# Patient Record
Sex: MALE | Race: WHITE | NOT HISPANIC OR LATINO | Employment: FULL TIME | URBAN - METROPOLITAN AREA
[De-identification: names, ages, dates, MRNs, and addresses within clinical notes are randomized per-mention and may not be internally consistent; named-entity substitution may affect disease eponyms.]

---

## 2022-12-23 ENCOUNTER — HOSPITAL ENCOUNTER (OUTPATIENT)
Facility: HOSPITAL | Age: 62
Setting detail: OBSERVATION
Discharge: HOME | End: 2022-12-24
Attending: STUDENT IN AN ORGANIZED HEALTH CARE EDUCATION/TRAINING PROGRAM | Admitting: INTERNAL MEDICINE
Payer: COMMERCIAL

## 2022-12-23 DIAGNOSIS — G62.9 NEUROPATHY: ICD-10-CM

## 2022-12-23 DIAGNOSIS — I48.91 ATRIAL FIBRILLATION WITH RAPID VENTRICULAR RESPONSE (H): ICD-10-CM

## 2022-12-23 DIAGNOSIS — K21.9 GASTROESOPHAGEAL REFLUX DISEASE WITHOUT ESOPHAGITIS: Primary | ICD-10-CM

## 2022-12-23 PROBLEM — E66.9 OBESITY: Status: ACTIVE | Noted: 2021-02-16

## 2022-12-23 PROBLEM — I48.0 PAROXYSMAL ATRIAL FIBRILLATION (H): Status: ACTIVE | Noted: 2019-11-14

## 2022-12-23 PROBLEM — R07.89 ATYPICAL CHEST PAIN: Status: ACTIVE | Noted: 2022-12-23

## 2022-12-23 LAB
ALBUMIN SERPL BCG-MCNC: 4.1 G/DL (ref 3.5–5.2)
ALP SERPL-CCNC: 68 U/L (ref 40–129)
ALT SERPL W P-5'-P-CCNC: 76 U/L (ref 10–50)
ANION GAP SERPL CALCULATED.3IONS-SCNC: 8 MMOL/L (ref 7–15)
AST SERPL W P-5'-P-CCNC: 59 U/L (ref 10–50)
BASOPHILS # BLD AUTO: 0.1 10E3/UL (ref 0–0.2)
BASOPHILS NFR BLD AUTO: 1 %
BILIRUB SERPL-MCNC: 0.3 MG/DL
BUN SERPL-MCNC: 21.3 MG/DL (ref 8–23)
CALCIUM SERPL-MCNC: 8.8 MG/DL (ref 8.8–10.2)
CHLORIDE SERPL-SCNC: 110 MMOL/L (ref 98–107)
CREAT SERPL-MCNC: 0.86 MG/DL (ref 0.67–1.17)
DEPRECATED HCO3 PLAS-SCNC: 26 MMOL/L (ref 22–29)
EOSINOPHIL # BLD AUTO: 0.2 10E3/UL (ref 0–0.7)
EOSINOPHIL NFR BLD AUTO: 2 %
ERYTHROCYTE [DISTWIDTH] IN BLOOD BY AUTOMATED COUNT: 13.2 % (ref 10–15)
GFR SERPL CREATININE-BSD FRML MDRD: >90 ML/MIN/1.73M2
GLUCOSE SERPL-MCNC: 131 MG/DL (ref 70–99)
HCT VFR BLD AUTO: 50.2 % (ref 40–53)
HGB BLD-MCNC: 16.7 G/DL (ref 13.3–17.7)
IMM GRANULOCYTES # BLD: 0 10E3/UL
IMM GRANULOCYTES NFR BLD: 0 %
LYMPHOCYTES # BLD AUTO: 1.6 10E3/UL (ref 0.8–5.3)
LYMPHOCYTES NFR BLD AUTO: 18 %
MAGNESIUM SERPL-MCNC: 2.2 MG/DL (ref 1.7–2.3)
MCH RBC QN AUTO: 29.7 PG (ref 26.5–33)
MCHC RBC AUTO-ENTMCNC: 33.3 G/DL (ref 31.5–36.5)
MCV RBC AUTO: 89 FL (ref 78–100)
MONOCYTES # BLD AUTO: 1.2 10E3/UL (ref 0–1.3)
MONOCYTES NFR BLD AUTO: 13 %
NEUTROPHILS # BLD AUTO: 5.7 10E3/UL (ref 1.6–8.3)
NEUTROPHILS NFR BLD AUTO: 66 %
NRBC # BLD AUTO: 0 10E3/UL
NRBC BLD AUTO-RTO: 0 /100
PLATELET # BLD AUTO: 170 10E3/UL (ref 150–450)
POTASSIUM SERPL-SCNC: 4.2 MMOL/L (ref 3.4–5.3)
PROT SERPL-MCNC: 7 G/DL (ref 6.4–8.3)
RBC # BLD AUTO: 5.63 10E6/UL (ref 4.4–5.9)
SODIUM SERPL-SCNC: 144 MMOL/L (ref 136–145)
TROPONIN T SERPL HS-MCNC: 11 NG/L
TROPONIN T SERPL HS-MCNC: 32 NG/L
TSH SERPL DL<=0.005 MIU/L-ACNC: 3.69 UIU/ML (ref 0.3–4.2)
WBC # BLD AUTO: 8.8 10E3/UL (ref 4–11)

## 2022-12-23 PROCEDURE — 36415 COLL VENOUS BLD VENIPUNCTURE: CPT | Performed by: PHYSICIAN ASSISTANT

## 2022-12-23 PROCEDURE — 84443 ASSAY THYROID STIM HORMONE: CPT | Performed by: PHYSICIAN ASSISTANT

## 2022-12-23 PROCEDURE — 258N000003 HC RX IP 258 OP 636: Performed by: PHYSICIAN ASSISTANT

## 2022-12-23 PROCEDURE — 99219 PR INITIAL OBSERVATION CARE,LEVEL II: CPT | Performed by: INTERNAL MEDICINE

## 2022-12-23 PROCEDURE — 93005 ELECTROCARDIOGRAM TRACING: CPT | Mod: 76 | Performed by: STUDENT IN AN ORGANIZED HEALTH CARE EDUCATION/TRAINING PROGRAM

## 2022-12-23 PROCEDURE — 93005 ELECTROCARDIOGRAM TRACING: CPT | Performed by: EMERGENCY MEDICINE

## 2022-12-23 PROCEDURE — G0378 HOSPITAL OBSERVATION PER HR: HCPCS

## 2022-12-23 PROCEDURE — 250N000013 HC RX MED GY IP 250 OP 250 PS 637: Performed by: PHYSICIAN ASSISTANT

## 2022-12-23 PROCEDURE — 85025 COMPLETE CBC W/AUTO DIFF WBC: CPT | Performed by: PHYSICIAN ASSISTANT

## 2022-12-23 PROCEDURE — 99285 EMERGENCY DEPT VISIT HI MDM: CPT | Mod: 25

## 2022-12-23 PROCEDURE — 96365 THER/PROPH/DIAG IV INF INIT: CPT

## 2022-12-23 PROCEDURE — 96361 HYDRATE IV INFUSION ADD-ON: CPT

## 2022-12-23 PROCEDURE — 96366 THER/PROPH/DIAG IV INF ADDON: CPT

## 2022-12-23 PROCEDURE — 83735 ASSAY OF MAGNESIUM: CPT | Performed by: PHYSICIAN ASSISTANT

## 2022-12-23 PROCEDURE — 93005 ELECTROCARDIOGRAM TRACING: CPT | Performed by: PHYSICIAN ASSISTANT

## 2022-12-23 PROCEDURE — 96375 TX/PRO/DX INJ NEW DRUG ADDON: CPT

## 2022-12-23 PROCEDURE — 250N000009 HC RX 250: Performed by: PHYSICIAN ASSISTANT

## 2022-12-23 PROCEDURE — 80053 COMPREHEN METABOLIC PANEL: CPT | Performed by: PHYSICIAN ASSISTANT

## 2022-12-23 PROCEDURE — 96376 TX/PRO/DX INJ SAME DRUG ADON: CPT

## 2022-12-23 PROCEDURE — 250N000011 HC RX IP 250 OP 636: Performed by: PHYSICIAN ASSISTANT

## 2022-12-23 PROCEDURE — 84484 ASSAY OF TROPONIN QUANT: CPT | Performed by: PHYSICIAN ASSISTANT

## 2022-12-23 PROCEDURE — 96374 THER/PROPH/DIAG INJ IV PUSH: CPT

## 2022-12-23 RX ORDER — DILTIAZEM HYDROCHLORIDE 30 MG/1
30 TABLET, FILM COATED ORAL ONCE
Status: COMPLETED | OUTPATIENT
Start: 2022-12-23 | End: 2022-12-23

## 2022-12-23 RX ORDER — ATENOLOL 25 MG/1
25 TABLET ORAL 2 TIMES DAILY
Status: ON HOLD | COMMUNITY
End: 2022-12-24

## 2022-12-23 RX ORDER — DILTIAZEM HYDROCHLORIDE 5 MG/ML
15 INJECTION INTRAVENOUS ONCE
Status: COMPLETED | OUTPATIENT
Start: 2022-12-23 | End: 2022-12-23

## 2022-12-23 RX ORDER — ASPIRIN 81 MG/1
162 TABLET, CHEWABLE ORAL ONCE
Status: COMPLETED | OUTPATIENT
Start: 2022-12-23 | End: 2022-12-23

## 2022-12-23 RX ORDER — GABAPENTIN 300 MG/1
300 CAPSULE ORAL 2 TIMES DAILY
COMMUNITY
End: 2022-12-26

## 2022-12-23 RX ORDER — HEPARIN SODIUM 10000 [USP'U]/100ML
0-5000 INJECTION, SOLUTION INTRAVENOUS CONTINUOUS
Status: DISCONTINUED | OUTPATIENT
Start: 2022-12-23 | End: 2022-12-24

## 2022-12-23 RX ORDER — DILTIAZEM HYDROCHLORIDE 5 MG/ML
25 INJECTION INTRAVENOUS ONCE
Status: COMPLETED | OUTPATIENT
Start: 2022-12-23 | End: 2022-12-23

## 2022-12-23 RX ORDER — ACETAMINOPHEN 500 MG
500-1000 TABLET ORAL EVERY 6 HOURS PRN
COMMUNITY

## 2022-12-23 RX ADMIN — DILTIAZEM HYDROCHLORIDE 15 MG: 5 INJECTION INTRAVENOUS at 20:23

## 2022-12-23 RX ADMIN — HEPARIN SODIUM AND DEXTROSE 1200 UNITS/HR: 10000; 5 INJECTION INTRAVENOUS at 22:32

## 2022-12-23 RX ADMIN — DILTIAZEM HYDROCHLORIDE 30 MG: 30 TABLET, FILM COATED ORAL at 19:33

## 2022-12-23 RX ADMIN — SODIUM CHLORIDE 1000 ML: 9 INJECTION, SOLUTION INTRAVENOUS at 19:04

## 2022-12-23 RX ADMIN — DILTIAZEM HYDROCHLORIDE 25 MG: 5 INJECTION INTRAVENOUS at 19:03

## 2022-12-23 RX ADMIN — DILTIAZEM HYDROCHLORIDE 5 MG/HR: 5 INJECTION, SOLUTION INTRAVENOUS at 21:33

## 2022-12-23 RX ADMIN — ASPIRIN 81 MG CHEWABLE TABLET 162 MG: 81 TABLET CHEWABLE at 20:56

## 2022-12-23 ASSESSMENT — ENCOUNTER SYMPTOMS
LIGHT-HEADEDNESS: 1
GASTROINTESTINAL NEGATIVE: 1
MUSCULOSKELETAL NEGATIVE: 1
PALPITATIONS: 1
RESPIRATORY NEGATIVE: 1
SHORTNESS OF BREATH: 0

## 2022-12-23 ASSESSMENT — ACTIVITIES OF DAILY LIVING (ADL)
ADLS_ACUITY_SCORE: 35

## 2022-12-24 ENCOUNTER — APPOINTMENT (OUTPATIENT)
Dept: CARDIOLOGY | Facility: HOSPITAL | Age: 62
End: 2022-12-24
Attending: INTERNAL MEDICINE
Payer: COMMERCIAL

## 2022-12-24 VITALS
BODY MASS INDEX: 40.43 KG/M2 | OXYGEN SATURATION: 97 % | HEIGHT: 74 IN | HEART RATE: 58 BPM | SYSTOLIC BLOOD PRESSURE: 130 MMHG | DIASTOLIC BLOOD PRESSURE: 73 MMHG | TEMPERATURE: 97.9 F | WEIGHT: 315 LBS | RESPIRATION RATE: 18 BRPM

## 2022-12-24 PROBLEM — I48.91 ATRIAL FIBRILLATION WITH RVR (H): Status: ACTIVE | Noted: 2022-12-24

## 2022-12-24 PROBLEM — K21.9 GASTROESOPHAGEAL REFLUX DISEASE: Status: ACTIVE | Noted: 2022-12-24

## 2022-12-24 PROBLEM — N40.0 BENIGN PROSTATIC HYPERPLASIA: Status: ACTIVE | Noted: 2022-12-24

## 2022-12-24 LAB
HOLD SPECIMEN: NORMAL
INR PPP: 0.99 (ref 0.85–1.15)
LVEF ECHO: NORMAL
MAGNESIUM SERPL-MCNC: 2.2 MG/DL (ref 1.7–2.3)
POTASSIUM SERPL-SCNC: 4.3 MMOL/L (ref 3.4–5.3)
UFH PPP CHRO-ACNC: 0.24 IU/ML
UFH PPP CHRO-ACNC: 0.37 IU/ML
UFH PPP CHRO-ACNC: 0.42 IU/ML
UFH PPP CHRO-ACNC: >1.1 IU/ML

## 2022-12-24 PROCEDURE — 85610 PROTHROMBIN TIME: CPT | Performed by: INTERNAL MEDICINE

## 2022-12-24 PROCEDURE — 250N000013 HC RX MED GY IP 250 OP 250 PS 637: Performed by: INTERNAL MEDICINE

## 2022-12-24 PROCEDURE — 96376 TX/PRO/DX INJ SAME DRUG ADON: CPT

## 2022-12-24 PROCEDURE — 36415 COLL VENOUS BLD VENIPUNCTURE: CPT | Performed by: INTERNAL MEDICINE

## 2022-12-24 PROCEDURE — 258N000003 HC RX IP 258 OP 636: Performed by: PHYSICIAN ASSISTANT

## 2022-12-24 PROCEDURE — 250N000011 HC RX IP 250 OP 636: Performed by: PHYSICIAN ASSISTANT

## 2022-12-24 PROCEDURE — 85520 HEPARIN ASSAY: CPT | Mod: 91 | Performed by: INTERNAL MEDICINE

## 2022-12-24 PROCEDURE — 99204 OFFICE O/P NEW MOD 45 MIN: CPT | Performed by: INTERNAL MEDICINE

## 2022-12-24 PROCEDURE — G0378 HOSPITAL OBSERVATION PER HR: HCPCS

## 2022-12-24 PROCEDURE — 999N000208 ECHOCARDIOGRAM COMPLETE

## 2022-12-24 PROCEDURE — 85520 HEPARIN ASSAY: CPT | Performed by: PHYSICIAN ASSISTANT

## 2022-12-24 PROCEDURE — 99217 PR OBSERVATION CARE DISCHARGE: CPT | Performed by: INTERNAL MEDICINE

## 2022-12-24 PROCEDURE — 83735 ASSAY OF MAGNESIUM: CPT | Performed by: INTERNAL MEDICINE

## 2022-12-24 PROCEDURE — 250N000009 HC RX 250: Performed by: PHYSICIAN ASSISTANT

## 2022-12-24 PROCEDURE — 84132 ASSAY OF SERUM POTASSIUM: CPT | Performed by: INTERNAL MEDICINE

## 2022-12-24 PROCEDURE — 99207 PR NO BILLABLE SERVICE THIS VISIT: CPT | Performed by: INTERNAL MEDICINE

## 2022-12-24 PROCEDURE — 96366 THER/PROPH/DIAG IV INF ADDON: CPT

## 2022-12-24 PROCEDURE — 36415 COLL VENOUS BLD VENIPUNCTURE: CPT | Performed by: PHYSICIAN ASSISTANT

## 2022-12-24 PROCEDURE — 93005 ELECTROCARDIOGRAM TRACING: CPT | Performed by: INTERNAL MEDICINE

## 2022-12-24 PROCEDURE — 93306 TTE W/DOPPLER COMPLETE: CPT | Mod: 26 | Performed by: INTERNAL MEDICINE

## 2022-12-24 PROCEDURE — 255N000002 HC RX 255 OP 636: Performed by: INTERNAL MEDICINE

## 2022-12-24 RX ORDER — FLECAINIDE ACETATE 50 MG/1
50 TABLET ORAL DAILY PRN
Qty: 20 TABLET | Refills: 0 | Status: SHIPPED | OUTPATIENT
Start: 2022-12-24 | End: 2022-12-26

## 2022-12-24 RX ORDER — ACETAMINOPHEN 500 MG
500-1000 TABLET ORAL EVERY 6 HOURS PRN
Status: DISCONTINUED | OUTPATIENT
Start: 2022-12-24 | End: 2022-12-24 | Stop reason: HOSPADM

## 2022-12-24 RX ORDER — AMOXICILLIN 250 MG
2 CAPSULE ORAL 2 TIMES DAILY PRN
Status: DISCONTINUED | OUTPATIENT
Start: 2022-12-24 | End: 2022-12-24 | Stop reason: HOSPADM

## 2022-12-24 RX ORDER — AMOXICILLIN 250 MG
1 CAPSULE ORAL 2 TIMES DAILY PRN
Status: DISCONTINUED | OUTPATIENT
Start: 2022-12-24 | End: 2022-12-24 | Stop reason: HOSPADM

## 2022-12-24 RX ORDER — ATENOLOL 25 MG/1
25 TABLET ORAL 2 TIMES DAILY
Status: DISCONTINUED | OUTPATIENT
Start: 2022-12-24 | End: 2022-12-24

## 2022-12-24 RX ORDER — PANTOPRAZOLE SODIUM 40 MG/1
40 TABLET, DELAYED RELEASE ORAL
Status: DISCONTINUED | OUTPATIENT
Start: 2022-12-24 | End: 2022-12-24 | Stop reason: HOSPADM

## 2022-12-24 RX ORDER — FLECAINIDE ACETATE 50 MG/1
50 TABLET ORAL DAILY PRN
Status: DISCONTINUED | OUTPATIENT
Start: 2022-12-24 | End: 2022-12-24 | Stop reason: HOSPADM

## 2022-12-24 RX ORDER — METOPROLOL TARTRATE 25 MG/1
25 TABLET, FILM COATED ORAL EVERY 6 HOURS
Status: DISCONTINUED | OUTPATIENT
Start: 2022-12-24 | End: 2022-12-24

## 2022-12-24 RX ORDER — GABAPENTIN 300 MG/1
300 CAPSULE ORAL 2 TIMES DAILY
Status: DISCONTINUED | OUTPATIENT
Start: 2022-12-24 | End: 2022-12-24 | Stop reason: HOSPADM

## 2022-12-24 RX ORDER — LIDOCAINE 40 MG/G
CREAM TOPICAL
Status: DISCONTINUED | OUTPATIENT
Start: 2022-12-24 | End: 2022-12-24 | Stop reason: HOSPADM

## 2022-12-24 RX ORDER — METOPROLOL SUCCINATE 100 MG/1
100 TABLET, EXTENDED RELEASE ORAL DAILY
Qty: 30 TABLET | Refills: 0 | Status: SHIPPED | OUTPATIENT
Start: 2022-12-24 | End: 2022-12-26

## 2022-12-24 RX ORDER — METOPROLOL SUCCINATE 100 MG/1
100 TABLET, EXTENDED RELEASE ORAL DAILY
Status: DISCONTINUED | OUTPATIENT
Start: 2022-12-24 | End: 2022-12-24 | Stop reason: HOSPADM

## 2022-12-24 RX ORDER — ONDANSETRON 2 MG/ML
4 INJECTION INTRAMUSCULAR; INTRAVENOUS EVERY 6 HOURS PRN
Status: DISCONTINUED | OUTPATIENT
Start: 2022-12-24 | End: 2022-12-24 | Stop reason: HOSPADM

## 2022-12-24 RX ORDER — ONDANSETRON 4 MG/1
4 TABLET, ORALLY DISINTEGRATING ORAL EVERY 6 HOURS PRN
Status: DISCONTINUED | OUTPATIENT
Start: 2022-12-24 | End: 2022-12-24 | Stop reason: HOSPADM

## 2022-12-24 RX ADMIN — DILTIAZEM HYDROCHLORIDE 10 MG/HR: 5 INJECTION, SOLUTION INTRAVENOUS at 09:44

## 2022-12-24 RX ADMIN — PANTOPRAZOLE SODIUM 40 MG: 40 TABLET, DELAYED RELEASE ORAL at 06:44

## 2022-12-24 RX ADMIN — GABAPENTIN 300 MG: 300 CAPSULE ORAL at 00:31

## 2022-12-24 RX ADMIN — HEPARIN SODIUM AND DEXTROSE 1350 UNITS/HR: 10000; 5 INJECTION INTRAVENOUS at 10:43

## 2022-12-24 RX ADMIN — ATENOLOL 25 MG: 25 TABLET ORAL at 08:16

## 2022-12-24 RX ADMIN — PERFLUTREN 3 ML: 6.52 INJECTION, SUSPENSION INTRAVENOUS at 12:43

## 2022-12-24 RX ADMIN — GABAPENTIN 300 MG: 300 CAPSULE ORAL at 08:16

## 2022-12-24 RX ADMIN — METOPROLOL SUCCINATE 100 MG: 100 TABLET, EXTENDED RELEASE ORAL at 17:55

## 2022-12-24 RX ADMIN — APIXABAN 5 MG: 5 TABLET, FILM COATED ORAL at 15:08

## 2022-12-24 ASSESSMENT — ACTIVITIES OF DAILY LIVING (ADL)
ADLS_ACUITY_SCORE: 35
ADLS_ACUITY_SCORE: 35
ADLS_ACUITY_SCORE: 31
ADLS_ACUITY_SCORE: 35
ADLS_ACUITY_SCORE: 31

## 2022-12-24 NOTE — H&P
"Admission History and Physical   Hugo Quinn,  1960, MRN [id number]    [facility]  Atrial fibrillation with rapid ventricular response (H) [I48.91]    PCP: Annika, [facility], [address] [place] [id number], [phone]   Code status:  Full code       Extended Emergency Contact Information  Primary Emergency Contact: Anastacia Suazo   [facility]  Home Phone: [phone]  Relation: Mother  Secondary Emergency Contact: NONE,GIVEN   [facility]  Home Phone: [phone]  Relation: Other       Assessment and Plan   62-year-old male with obesity, paroxysmal A. Fib presenting with A. fib RVR and elevated troponin     1.  Paroxysmal A. fib RVR.  Ventricular rates improved on IV diltiazem drip.  Resume atenolol.  on IV heparin.  Patient reports excessive bleeding while on Eliquis in the past.  Check echocardiogram.  Consult cardiology  2.  Elevated troponin.  Likely secondary to demand ischemia in the setting of tachycardia.  Patient denies having any chest pain.  On IV heparin  3.  GERD on PPI  4.  Postherpetic neuralgia.  Takes gabapentin  5.  Morbid obesity Body mass index is 41.09 kg/m .        DVT Prophylaxis: On IV heparin     Chief Complaint:  Palpitations, feeling flushed     HPI:    Hugo Quinn is a 62 year old old male with past medical history of paroxysmal A. fib who presented for evaluation of palpitations   History is provided by patient and medical records  Patient reports onset of palpitations and feeling flushed at around 5:30 PM today.  No chest pain, shortness of breath or nausea.  He has longstanding history of paroxysmal A. fib and has a \"minor \"episode of A. fib almost every day.  He was admitted at a hospital in [place] for A. fib RVR in 2021, at that time he was discharged on Eliquis but it was subsequently discontinued due to excessive bleeding.  Patient has been taking baby aspirin for years.  On initial evaluation in the ER patient was in A. fib RVR " "heart rate 160s.  Labs significant for troponin 11 and 32.  Initial EKG showed A. fib with RVR at 160 bpm, ST segment elevation in lead I and aVL with ST segment depression in inferior and lateral leads however patient has been denying chest pain.  Patient was started on IV diltiazem and IV heparin.  Subsequent EKGs showed no ST segment elevation or depression.       Medical History  Past Medical History:   Diagnosis Date     Basal cell carcinoma    Paroxysmal atrial fibrillation  Obesity Body mass index is 41.09 kg/m .       Surgical History  He  has a past surgical history that includes Mohs micrographic procedure.       Social History  Reviewed, and he  reports that he has never smoked. He does not have any smokeless tobacco history on file.  Denies alcohol use  Social History     Tobacco Use     Smoking status: Never     Smokeless tobacco: Not on file   Substance Use Topics     Alcohol use: Not on file          Allergies  No Known Allergies Family History  Reviewed, and no heart disease in the family          Prior to Admission Medications   (Not in a hospital admission)         Review of Systems:  A 12 point comprehensive review of systems was negative except as noted. Physical Exam:  Temp:  [97.8  F (36.6  C)] 97.8  F (36.6  C)  Pulse:  [] 95  Resp:  [10-27] 16  BP: (102-189)/() 119/78  SpO2:  [93 %-99 %] 96 %    /78   Pulse 95   Temp 97.8  F (36.6  C) (Oral)   Resp 16   Ht 1.88 m (6' 2\")   Wt 145.2 kg (320 lb)   SpO2 96%   BMI 41.09 kg/m      General Appearance:   Obese male in no acute distress   Head:    Normocephalic, without obvious abnormality, atraumatic   Eyes:    PERRL, conjunctiva/corneas clear, EOM's intact,both eyes    Ears:    Normal external ear canals no drainage or erythema bilat.   Nose:   Nares normal by gross inspection,  mucosa normal, no drainage or sinus tenderness   Throat:   Lips, mucosa, and tongue normal; teeth and gums normal   Neck:   Supple, symmetrical, " trachea midline, no adenopathy;        thyroid:  No enlargement/tenderness/nodules   Back:     Symmetric, no curvature, ROM normal, no CVA tenderness   Lungs:    Clear   Chest wall:    No tenderness or deformity   Heart:   Irregularly irregular rate and rhythm, S1 and S2 normal, no murmur, no rubs, no JVD, no edema   Abdomen:     Soft, non-tender, bowel sounds active all four quadrants,     no masses, no hepatosplenomegaly   Musculoskeletal:   Extremities are warm and non-tender, atraumatic, no joint swelling or tenderness   Pulses:   2+ and symmetric all extremities   Skin:   no rashes or lesions on exposed areas, please see nursing assessment for full skin assessment   Neurologic:  Awake, alert, nonfocal        Pertinent Labs  Lab Results: personally reviewed.   Recent Labs   Lab 12/23/22  2100      CO2 26   BUN 21.3   ALBUMIN 4.1   ALKPHOS 68   ALT 76*   AST 59*     Recent Labs   Lab 12/23/22  1902   WBC 8.8   HGB 16.7   HCT 50.2        No results for input(s): CKTOTAL, TROPONINI in the last 168 hours.    Invalid input(s): TROPONINT, CKMBINDEX    MOST RECENT A1c, Iron, TIBC, Coags, TFTs  No results found for: HGBA1C, INR, PTT  No results found for: IRON  Lab Results   Component Value Date    TSH 3.69 12/23/2022         Pertinent Radiology  No results found for this visit on 12/23/22.    Telemetry: Afib     1 st EKG Results: A. fib RVR, ST elevations in leads I and aVL, T wave inversions in leads II, 3, aVF, V3 through V6 (question lead reversal)  Second EKG shows atrial fibrillation with controlled ventricular rate, no ST elevations    Advanced Care Planning  Treatment and discharge Planning discussed with patient  Anticipated Length of Stay in midnights (including a midnight in the Emergency Department after triage if applicable): At least 1 midnight for evaluation and treatment of A. fib RVR and elevated troponin      Supriya Abraham MD  Internal Medicine Hospitalist  12/24/2022

## 2022-12-24 NOTE — ED TRIAGE NOTES
Pt presents to ED with eval of a fib. Pt has hx of a fib for past 20 years. Pt reports he was shopping when he felt his heart rate increase - tried sitting down but had no relief. Came to ED after 1 hour of elevated HR. Denies CP or SOB. Elevated BP as well on arrival.

## 2022-12-24 NOTE — ED PROVIDER NOTES
EMERGENCY DEPARTMENT ENCOUNTER      NAME: Hugo Quinn  AGE: 62 year old male  YOB: 1960  MRN: [id number]  EVALUATION DATE & TIME: 12/23/2022  6:29 PM    PCP: No primary care provider on file.    ED PROVIDER: Hernesto Sr PA-C      Chief Complaint   Patient presents with     Palpitations         FINAL IMPRESSION:  1. Atrial fibrillation with rapid ventricular response (H)          MEDICAL DECISION MAKING:    Pertinent Labs & Imaging studies reviewed. (See chart for details)  62 year old male presents to the Emergency Department for evaluation of palpitations, persistent tachycardia and mild lightheadedness.    After obtaining history present illness, reviewing vitals and examining the patient plan to medically manage his EKG findings of A. fib with RVR with fluid bolus as well as boluses of diltiazem.  If we can show an improved rate I do want a repeat EKG to see if the ischemic changes on the ST segments do improve.  Certainly if he develops any significant chest pain diaphoresis or worsening shortness of breath we can move quicker towards synchronized cardioversion.  We do not have previous EKGs on record for comparison.    Patient was immediately staffed with Dr. Ahuja.    After initial bolus of diltiazem patient's heart rate decreased significantly under 100.  Repeat EKG at that time showed heart rate of 93, ST segment findings that were previously noted with the previous EKG had normalized.    Patient continued to be asymptomatic in the form of no shortness of breath, no chest pain, no diaphoresis.  Initial set of blood tests are unremarkable.  We did order the patient some oral diltiazem, however shortly after receiving this the patient then jumped back up into the 150s 160s.  We repeated a dose of IV diltiazem at 15 mg which she was very responsive to however because of his volatility and the fact that he has not converted we will order a dill drip.  I did discussed the case with Dr. Vizcaino  from cardiology, he was able to review the EKGs, agrees with diltiazem drip.  He did recommend heparinization if the second troponin would come back positive.  I will also order the patient a dose of aspirin.  At this point time no reports of respiratory symptoms therefore I did not obtain any type of chest imaging at this time.    During the patient's stay here synchronized cardioversion was considered, however based on the patient's reports of paroxysmal episodes of A. fib and not truly knowing how often how long he is actually and it certainly the risk for possible thrombus in the hard could be fairly high.  Because he is responding well to diltiazem at this point time rate control seems to be the safest and most logical approach at this time.    Medical Decision Making    History:    Supplemental history from: Documented in HPI, if applicable    External Record(s) reviewed: Outpatient Record: [facility] from [facility], previous EKG reads and outpatient work-ups were reviewed.    Work Up:    Chart documentation includes differential considered and any EKGs or imaging independently interpreted by provider.    In additional to work up documented, I considered the following work up: See chart documentation, if applicable.    External consultation:    Discussion of management with another provider: See chart documentation, if applicable and Cardiology    Complicating factors:    Care impacted by chronic illness: N/A    Care affected by social determinants of health: N/A    Disposition considerations: Admit.      Discussed case with hospitalist (Dr. East)  who is in agreement excepting the patient to cardiac telemetry for heart rate management as well as further monitoring with regards to further cardiac work-up and following troponin results.    Second troponin returned over 30 this is above the normal male respiratory and strange and also tripled compared to the previous one therefore I will initiate  heparinization.  I did discuss this with the hospitalist.  ED COURSE    I met with the patient, obtained history, performed an initial exam, and discussed options and plan for diagnostics and treatment here in the ED.    At the conclusion of the encounter I discussed the results of all of the tests and the disposition. The questions were answered. The patient or family acknowledged understanding and was agreeable with the care plan.     MEDICATIONS GIVEN IN THE EMERGENCY:  Medications   diltiazem (CARDIZEM) 125 mg in sodium chloride 0.9 % 125 mL infusion (5 mg/hr Intravenous New Bag 12/23/22 2133)   heparin ANTICOAGULANT loading dose for  LOW INTENSITY TREATMENT* Give BEFORE starting heparin infusion (has no administration in time range)   heparin infusion 25,000 units in D5W 250 mL ANTICOAGULANT (has no administration in time range)   0.9% sodium chloride BOLUS (0 mLs Intravenous Stopped 12/23/22 2132)   diltiazem (CARDIZEM) injection 25 mg (25 mg Intravenous Given 12/23/22 1903)   diltiazem (CARDIZEM) tablet 30 mg (30 mg Oral Given 12/23/22 1933)   diltiazem (CARDIZEM) injection 15 mg (15 mg Intravenous Given 12/23/22 2023)   aspirin (ASA) chewable tablet 162 mg (162 mg Oral Given 12/23/22 2056)       NEW PRESCRIPTIONS STARTED AT TODAY'S ER VISIT  New Prescriptions    No medications on file            =================================================================    HPI    Patient information was obtained from: Patient and review of [facility] records  Hugo Quinn is a 62 year old male with a pertinent history of paroxysmal atrial fibrillation, no long-term anticoagulants, only daily aspirin who presents to this ED for evaluation of rapid heartbeat and palpitations.  Patient reports that his symptoms started around 5:30 PM.  He states that he has these types of episodes quite frequently but usually they only last a few minutes and then go away.  Because of this 1 lasting greater than an hour he  presented here for assessment.  He denies any acute chest pain or significant shortness of breath.  He denies any nausea vomiting or diaphoresis.  He states that he has had this in the past, cardioversion has been attempted and failed.  Usually medications in the form of beta-blockers or calcium channel blockers seem to work.  He states in the past he has been on Eliquis, however he stopped this about a year ago because of side effects and is only on daily aspirin.  Denies any recent fever or chills.  Denies any numbness tingling.      REVIEW OF SYSTEMS   Review of Systems   HENT: Negative.    Respiratory: Negative.  Negative for shortness of breath.    Cardiovascular: Positive for palpitations. Negative for chest pain.   Gastrointestinal: Negative.    Genitourinary: Negative.    Musculoskeletal: Negative.    Skin: Negative.    Neurological: Positive for light-headedness.   All other systems reviewed and are negative.         PAST MEDICAL HISTORY:  Past Medical History:   Diagnosis Date     Basal cell carcinoma        PAST SURGICAL HISTORY:  Past Surgical History:   Procedure Laterality Date     MOHS MICROGRAPHIC PROCEDURE           CURRENT MEDICATIONS:      Current Facility-Administered Medications:      diltiazem (CARDIZEM) 125 mg in sodium chloride 0.9 % 125 mL infusion, 5-15 mg/hr, Intravenous, Continuous, Hernesto Sr PA-C, Last Rate: 5 mL/hr at 12/23/22 2133, 5 mg/hr at 12/23/22 2133     heparin ANTICOAGULANT loading dose for  LOW INTENSITY TREATMENT* Give BEFORE starting heparin infusion, 6,000 Units, Intravenous, Once, Hernesto Sr PA-C     heparin infusion 25,000 units in D5W 250 mL ANTICOAGULANT, 0-5,000 Units/hr, Intravenous, Continuous, Hernesto Sr PA-C    Current Outpatient Medications:      acetaminophen (TYLENOL) 500 MG tablet, Take 500-1,000 mg by mouth every 6 hours as needed for pain, Disp: , Rfl:      aspirin 81 MG tablet, Take 81 mg by mouth 2 times daily, Disp: , Rfl:       "atenolol (TENORMIN) 25 MG tablet, Take 25 mg by mouth 2 times daily, Disp: , Rfl:      gabapentin (NEURONTIN) 300 MG capsule, Take 300 mg by mouth 2 times daily, Disp: , Rfl:      omeprazole (PRILOSEC) 20 MG DR capsule, Take 20 mg by mouth daily before breakfast, Disp: , Rfl:       ALLERGIES:  No Known Allergies    FAMILY HISTORY:  No family history on file.    SOCIAL HISTORY:   Social History     Socioeconomic History     Marital status: Single   Tobacco Use     Smoking status: Never       VITALS:  Patient Vitals for the past 24 hrs:   BP Temp Temp src Pulse Resp SpO2 Height Weight   12/23/22 2115 119/78 -- -- 100 13 97 % -- --   12/23/22 2045 117/66 -- -- 89 10 96 % -- --   12/23/22 2031 120/70 -- -- 94 17 96 % -- --   12/23/22 2016 111/63 -- -- (!) 124 15 94 % -- --   12/23/22 2001 121/58 -- -- (!) 145 24 97 % -- --   12/23/22 1945 128/57 -- -- (!) 130 13 95 % -- --   12/23/22 1935 102/69 -- -- 120 13 96 % -- --   12/23/22 1920 (!) 120/100 -- -- 100 25 96 % -- --   12/23/22 1914 -- 97.8  F (36.6  C) Oral -- -- -- 1.88 m (6' 2\") 145.2 kg (320 lb)   12/23/22 1905 119/81 -- -- (!) 157 27 93 % -- --   12/23/22 1850 (!) 179/96 -- -- (!) 160 14 96 % -- --   12/23/22 1835 (!) 189/97 -- -- (!) 159 25 99 % -- --   12/23/22 1833 (!) 189/97 -- -- (!) 165 23 -- -- --       PHYSICAL EXAM    Physical Exam  Vitals and nursing note reviewed.   Constitutional:       General: He is not in acute distress.     Appearance: He is obese. He is not ill-appearing, toxic-appearing or diaphoretic.   HENT:      Head: Normocephalic.      Right Ear: External ear normal.      Left Ear: External ear normal.   Eyes:      Conjunctiva/sclera: Conjunctivae normal.   Cardiovascular:      Rate and Rhythm: Tachycardia present. Rhythm irregular.      Pulses: Normal pulses.   Pulmonary:      Effort: Pulmonary effort is normal. No respiratory distress.   Abdominal:      General: Abdomen is flat.      Tenderness: There is no guarding or rebound. "   Musculoskeletal:         General: No tenderness, deformity or signs of injury. Normal range of motion.      Cervical back: Normal range of motion.   Skin:     General: Skin is warm and dry.      Findings: No rash.   Neurological:      General: No focal deficit present.      Mental Status: He is alert. Mental status is at baseline.      Sensory: No sensory deficit.      Motor: No weakness.   Psychiatric:         Mood and Affect: Mood normal.          LAB:  All pertinent labs reviewed and interpreted.  Results for orders placed or performed during the hospital encounter of 12/23/22   Result Value Ref Range    Troponin T, High Sensitivity 11 <=22 ng/L   TSH with free T4 reflex   Result Value Ref Range    TSH 3.69 0.30 - 4.20 uIU/mL   Comprehensive metabolic panel   Result Value Ref Range    Sodium 144 136 - 145 mmol/L    Potassium 4.2 3.4 - 5.3 mmol/L    Chloride 110 (H) 98 - 107 mmol/L    Carbon Dioxide (CO2) 26 22 - 29 mmol/L    Anion Gap 8 7 - 15 mmol/L    Urea Nitrogen 21.3 8.0 - 23.0 mg/dL    Creatinine 0.86 0.67 - 1.17 mg/dL    Calcium 8.8 8.8 - 10.2 mg/dL    Glucose 131 (H) 70 - 99 mg/dL    Alkaline Phosphatase 68 40 - 129 U/L    AST 59 (H) 10 - 50 U/L    ALT 76 (H) 10 - 50 U/L    Protein Total 7.0 6.4 - 8.3 g/dL    Albumin 4.1 3.5 - 5.2 g/dL    Bilirubin Total 0.3 <=1.2 mg/dL    GFR Estimate >90 >60 mL/min/1.73m2   Result Value Ref Range    Magnesium 2.2 1.7 - 2.3 mg/dL   CBC with platelets and differential   Result Value Ref Range    WBC Count 8.8 4.0 - 11.0 10e3/uL    RBC Count 5.63 4.40 - 5.90 10e6/uL    Hemoglobin 16.7 13.3 - 17.7 g/dL    Hematocrit 50.2 40.0 - 53.0 %    MCV 89 78 - 100 fL    MCH 29.7 26.5 - 33.0 pg    MCHC 33.3 31.5 - 36.5 g/dL    RDW 13.2 10.0 - 15.0 %    Platelet Count 170 150 - 450 10e3/uL    % Neutrophils 66 %    % Lymphocytes 18 %    % Monocytes 13 %    % Eosinophils 2 %    % Basophils 1 %    % Immature Granulocytes 0 %    NRBCs per 100 WBC 0 <1 /100    Absolute Neutrophils 5.7 1.6  - 8.3 10e3/uL    Absolute Lymphocytes 1.6 0.8 - 5.3 10e3/uL    Absolute Monocytes 1.2 0.0 - 1.3 10e3/uL    Absolute Eosinophils 0.2 0.0 - 0.7 10e3/uL    Absolute Basophils 0.1 0.0 - 0.2 10e3/uL    Absolute Immature Granulocytes 0.0 <=0.4 10e3/uL    Absolute NRBCs 0.0 10e3/uL   Result Value Ref Range    Troponin T, High Sensitivity 32 (H) <=22 ng/L       RADIOLOGY:  Reviewed all pertinent imaging. Please see official radiology report.  No orders to display       EKG:    Performed at: 1820    The EKG showing narrow complex tachycardia that is irregular likely representing atrial fibrillation with RVR at 160 bpm.  There is ST segment elevation in lead I and aVL with ST segment depression noted in the inferior and lateral leads.  Findings are concerning for acute ischemia.  Patient currently denies any acute chest pain or shortness of breath.    Plan to rapidly attempt a slow rate with diltiazem and repeat EKG to see if these findings do normalize, I suspect that this is likely demand ischemia that we are seen rather than ACS.  Certainly we can consider cardioversion, patient reports that he has had this in the past but has not been successful and he is only on aspirin.  It sounds as if the patient has only been in this arrhythmia for about an hour and a half now.    Repeat EKG at 1932    Showing atrial fibrillation at a rate of 93 bpm.  ST segments are normal with no acute elevation or depression.  T waves are upright.  QTC measured at 452.  I have independently reviewed and interpreted the EKG(s) documented above.  Reviewed with Dr. Leigha Sr PA-C  Emergency Medicine  [facility]     Hernesto Sr PA-C  12/23/22 2115       Hernesto Sr PA-C  12/23/22 2204

## 2022-12-24 NOTE — ED NOTES
ED Triage Provider Note  [facility] EMERGENCY DEPARTMENT  Encounter Date: Dec 23, 2022    History:  Chief Complaint   Patient presents with     Palpitations     Hugo Quinn is a 62 year old male   Here for palpitations      Exam:  Patient was taken directly back to the emergency department    EKG shows atrial fibrillation with rapid ventricular response    I did not interview or examine the patient      Medical Decision Making:  Patient arriving to the ED with problem as above. A medical screening exam was performed.      orders initiated from Triage. The patient is most appropriate to be immediately roomed.       Bradley Wilson MD  12/23/2022 at 6:28 PM     Bradley Wilson MD  12/23/22 9316

## 2022-12-24 NOTE — PHARMACY-ADMISSION MEDICATION HISTORY
Pharmacy Note - Admission Medication History    ______________________________________________________________________    Prior To Admission (PTA) med list completed and updated in EMR.       PTA Med List   Medication Sig Last Dose     acetaminophen (TYLENOL) 500 MG tablet Take 500-1,000 mg by mouth every 6 hours as needed for pain 12/22/2022     aspirin 81 MG tablet Take 81 mg by mouth 2 times daily 12/23/2022 at x2     atenolol (TENORMIN) 25 MG tablet Take 25 mg by mouth 2 times daily 12/23/2022 at x2     gabapentin (NEURONTIN) 300 MG capsule Take 300 mg by mouth 2 times daily 12/23/2022 at x1     omeprazole (PRILOSEC) 20 MG DR capsule Take 20 mg by mouth daily before breakfast 12/23/2022 at am       Information source(s): [facility]  Method of interview communication: in-person    Summary of Changes to PTA Med List  New: gabapentin, APAP  Discontinued: tamsulosin  Changed: atenolol, omeprazole    Patient was asked about OTC/herbal products specifically.  PTA med list reflects this.    In the past week, patient estimated taking medication this percent of the time:  greater than 90%.    Allergies were reviewed, assessed, and updated with the patient.      Patient does not use any multi-dose medications prior to admission.    The information provided in this note is only as accurate as the sources available at the time of the update(s).    Thank you,  Kasia Acevedo, [facility]  12/23/2022 9:06 PM

## 2022-12-24 NOTE — ED PROVIDER NOTES
"Emergency Department Midlevel Supervisory Note     I personally saw the patient and performed a substantive portion of the visit including all aspects of the medical decision making.    ED Course:  6:40 PM Hernesto Sr PA-C staffed patient with me. I agree with their assessment and plan of management, and I will see the patient. I met with the patient to introduce myself, gather additional history, perform my initial exam, and discuss the plan.     Brief HPI:     Hugo Quinn is a 62 year old male who presents for evaluation of heart palpitations and lightheadedness since 1730 today. History of atrial fibrillation. No chest pain, shortness of breath.     I, Josy Carrion, am serving as a scribe to document services personally performed by Sundeep Ahuja, based on my observations and the provider's statements to me.   I, Sundeep Ahuja attest that Josy Carrion was acting in a scribe capacity, has observed my performance of the services and has documented them in accordance with my direction.    Brief Physical Exam: /71   Pulse 105   Temp 97.8  F (36.6  C) (Oral)   Resp 16   Ht 1.88 m (6' 2\")   Wt 145.2 kg (320 lb)   SpO2 94%   BMI 41.09 kg/m    Constitutional:  Alert, in no acute distress  EYES: Conjunctivae clear  HENT:  Atraumatic, normocephalic  Respiratory:  Respirations even, unlabored, in no acute respiratory distress  Cardiovascular:  Regular rate and rhythm, good peripheral perfusion  GI: Soft, nondistended, nontender, no palpable masses, no rebound, no guarding   Musculoskeletal:  No edema. No cyanosis. Range of motion major extremities intact.    Integument: Warm, Dry, No erythema, No rash.   Neurologic:  Alert & oriented, no focal deficits noted  Psych: Normal mood and affect     MDM:  Patient presented with atrial fibrillation.  He states that this happens to him from time to time and this did not feel significantly different however it stuck around for the last hour.  Patient was not " in any acute distress and denied any significant chest pain.  Initial EKG showed some ST elevation along with A. fib with RVR.  Without chest pain symptoms we chose to rate control with some diltiazem.  Patient's blood pressure and other vital signs were stable.  Diltiazem improved his heart rate and the ST changes seem to improve.  Initial troponin was slightly elevated but again the patient had no significant chest pain here in the ER.  Patient required multiple doses of diltiazem and eventually a diltiazem drip to control his heart rate.  Spoke to cardiology and with the presence of the ST changes and the difficult to control atrial fibrillation decision was made to admit to the hospital for further care.  Repeat troponin was slightly higher than the initial troponin so we started heparin to cover for possible ACS.       1. Atrial fibrillation with rapid ventricular response (H)        Labs and Imaging:  Results for orders placed or performed during the hospital encounter of 12/23/22   Result Value Ref Range    Troponin T, High Sensitivity 11 <=22 ng/L   TSH with free T4 reflex   Result Value Ref Range    TSH 3.69 0.30 - 4.20 uIU/mL   Comprehensive metabolic panel   Result Value Ref Range    Sodium 144 136 - 145 mmol/L    Potassium 4.2 3.4 - 5.3 mmol/L    Chloride 110 (H) 98 - 107 mmol/L    Carbon Dioxide (CO2) 26 22 - 29 mmol/L    Anion Gap 8 7 - 15 mmol/L    Urea Nitrogen 21.3 8.0 - 23.0 mg/dL    Creatinine 0.86 0.67 - 1.17 mg/dL    Calcium 8.8 8.8 - 10.2 mg/dL    Glucose 131 (H) 70 - 99 mg/dL    Alkaline Phosphatase 68 40 - 129 U/L    AST 59 (H) 10 - 50 U/L    ALT 76 (H) 10 - 50 U/L    Protein Total 7.0 6.4 - 8.3 g/dL    Albumin 4.1 3.5 - 5.2 g/dL    Bilirubin Total 0.3 <=1.2 mg/dL    GFR Estimate >90 >60 mL/min/1.73m2   Result Value Ref Range    Magnesium 2.2 1.7 - 2.3 mg/dL   CBC with platelets and differential   Result Value Ref Range    WBC Count 8.8 4.0 - 11.0 10e3/uL    RBC Count 5.63 4.40 - 5.90 10e6/uL     Hemoglobin 16.7 13.3 - 17.7 g/dL    Hematocrit 50.2 40.0 - 53.0 %    MCV 89 78 - 100 fL    MCH 29.7 26.5 - 33.0 pg    MCHC 33.3 31.5 - 36.5 g/dL    RDW 13.2 10.0 - 15.0 %    Platelet Count 170 150 - 450 10e3/uL    % Neutrophils 66 %    % Lymphocytes 18 %    % Monocytes 13 %    % Eosinophils 2 %    % Basophils 1 %    % Immature Granulocytes 0 %    NRBCs per 100 WBC 0 <1 /100    Absolute Neutrophils 5.7 1.6 - 8.3 10e3/uL    Absolute Lymphocytes 1.6 0.8 - 5.3 10e3/uL    Absolute Monocytes 1.2 0.0 - 1.3 10e3/uL    Absolute Eosinophils 0.2 0.0 - 0.7 10e3/uL    Absolute Basophils 0.1 0.0 - 0.2 10e3/uL    Absolute Immature Granulocytes 0.0 <=0.4 10e3/uL    Absolute NRBCs 0.0 10e3/uL   Result Value Ref Range    Troponin T, High Sensitivity 32 (H) <=22 ng/L     I have reviewed the relevant laboratory and radiology studies    Procedures:  I was present for the key portions of this procedure: none    Sundeep Ahuja MD  [facility] EMERGENCY DEPARTMENT  [address] [id number] [phone]     Sundeep Ahuja MD  12/23/22 0844

## 2022-12-24 NOTE — PROGRESS NOTES
[facility]    PROGRESS NOTE - Hospitalist Service    Assessment and Plan    Active Problems:    HTN (hypertension)    Paroxysmal atrial fibrillation (H)    Atrial fibrillation with RVR (H)    Gastroesophageal reflux disease    Hugo Quinn is a 62 year old male with h/o obesity, paroxysmal A. Fib presenting with A. fib RVR and elevated troponin      Afib with RVR H/O PAF.   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 will need anticoagulation likely pending cards recs   - Echo pending   - pending cardiology consult     Minimally Elevated troponin. Possible demand but not really that High at 32  -  Patient denies having any chest pain.   - On IV heparin  - Echo pending  - Cards consult     GERD on PPI    Postherpetic neuralgia.  Takes gabapentin    Morbid obesity Body mass index is 41.09 kg/m .  - denies Sleep apnea     COVID-19 PCR Results    COVID-19 PCR Results   No data to display.         COVID-19 Antibody Results, Testing for Immunity    COVID-19 Antibody Results, Testing for Immunity   No data to display.            VTE prophylaxis:  IV heparin   DIET: Orders Placed This Encounter      Combination Diet Low Saturated Fat Na <2400mg Diet, No Caffeine Diet    Drains/Lines: none  Weight bearing status: WBAT  Disposition/Barriers to discharge: pending cards further recs and rate control   Code Status: Full Code    Subjective:  Denies CP or SOB.     PHYSICAL EXAM  Temp:  [97.8  F (36.6  C)] 97.8  F (36.6  C)  Pulse:  [] 55  Resp:  [10-46] 27  BP: ()/() 127/66  SpO2:  [92 %-99 %] 97 %  Wt Readings from Last 1  Encounters:   12/23/22 145.2 kg (320 lb)       Intake/Output Summary (Last 24 hours) at 12/24/2022 0740  Last data filed at 12/24/2022 0000  Gross per 24 hour   Intake 1000 ml   Output 1525 ml   Net -525 ml      Body mass index is 41.09 kg/m .    Physical Exam  Constitutional:       Appearance: He is obese.   Cardiovascular:      Rate and Rhythm: Rhythm regularly irregular.      Pulses: Normal pulses.      Heart sounds: Normal heart sounds.   Pulmonary:      Effort: Pulmonary effort is normal.      Breath sounds: Normal breath sounds.   Abdominal:      General: Bowel sounds are normal.      Palpations: Abdomen is soft.   Musculoskeletal:         General: Normal range of motion.   Skin:     General: Skin is warm and dry.      Capillary Refill: Capillary refill takes less than 2 seconds.   Neurological:      General: No focal deficit present.      Mental Status: He is alert and oriented to person, place, and time. Mental status is at baseline.       PERTINENT LABS/IMAGING:  No results found for this visit on 12/23/22.    Recent Labs   Lab 12/24/22  1154 12/23/22  2100 12/23/22  1902   WBC  --   --  8.8   HGB  --   --  16.7   MCV  --   --  89   PLT  --   --  170   INR 0.99  --   --    NA  --  144  --    POTASSIUM 4.3 4.2  --    CHLORIDE  --  110*  --    CO2  --  26  --    BUN  --  21.3  --    CR  --  0.86  --    ANIONGAP  --  8  --    BA  --  8.8  --    GLC  --  131*  --    ALBUMIN  --  4.1  --    PROTTOTAL  --  7.0  --    BILITOTAL  --  0.3  --    ALKPHOS  --  68  --    ALT  --  76*  --    AST  --  59*  --      No results found for this or any previous visit (from the past 24 hour(s)).  No results for input(s): CHOL, HDL, LDL, TRIG, CHOLHDLRATIO in the last 78507 hours.  No results for input(s): LDL in the last 06199 hours.  Recent Labs   Lab Test 12/23/22  2100      POTASSIUM 4.2   CHLORIDE 110*   CO2 26   *   BUN 21.3   CR 0.86   GFRESTIMATED >90   BA 8.8     No results for input(s): A1C in the last  09063 hours.  Recent Labs   Lab Test 12/23/22 1902   HGB 16.7     No results for input(s): TROPONINI in the last 08460 hours.  No results for input(s): BNP, NTBNPI, NTBNP in the last 45442 hours.  Recent Labs   Lab Test 12/23/22 1902   TSH 3.69     No results for input(s): INR in the last 96128 hours.    Estrellita Ruelas MD  [facility] Medicine Service  [phone]

## 2022-12-24 NOTE — PLAN OF CARE
Problem: Plan of Care - These are the overarching goals to be used throughout the patient stay.    Goal: Plan of Care Review  Description: The Plan of Care Review/Shift note should be completed every shift.  The Outcome Evaluation is a brief statement about your assessment that the patient is improving, declining, or no change.  This information will be displayed automatically on your shift note.  Outcome: Progressing   Goal Outcome Evaluation:       Patient denies chest pain. Heparin drip stopped, first dose of Eliquis given. HR 58-65, patient is in sinus rhythm.

## 2022-12-24 NOTE — PLAN OF CARE
Problem: Plan of Care - These are the overarching goals to be used throughout the patient stay.    Goal: Plan of Care Review  Description: The Plan of Care Review/Shift note should be completed every shift.  The Outcome Evaluation is a brief statement about your assessment that the patient is improving, declining, or no change.  This information will be displayed automatically on your shift note.  Outcome: Progressing   Goal Outcome Evaluation:         Pt alert and orientated X4, up with SBA to bathroom. Denies pain or discomfort. Todd SOB. Was on diltiazem drip for afib and converted to sinus savita and was discontinued. Pt HR 49-52, asymptomatic. Continues on IV heparin gtt. Pt wanting to see if able to discharge home this evening. Echo done, CARDS to see pt.

## 2022-12-24 NOTE — PLAN OF CARE
PRIMARY DIAGNOSIS: Afib with RVR  OUTPATIENT/OBSERVATION GOALS TO BE MET BEFORE DISCHARGE:  1. Ruled out acute coronary syndrome (negative or stable Troponin):  Yes  2. Pain Status: Pain free.  3. Appropriate provocative testing performed: Yes  - Stress Test Procedure: Echo  - Interpretation of cardiac rhythm per telemetry tech: Atrial fibrillation.     4. Cleared by Consultants (if applicable):No  5. Return to near baseline physical activity: Yes  Discharge Planner Nurse   Safe discharge environment identified: Yes  Barriers to discharge: Yes       Entered by: Rebecca Lou RN 12/24/2022 6:11 AM     Please review provider order for any additional goals.   Nurse to notify provider when observation goals have been met and patient is ready for discharge.  Goal Outcome Evaluation:  Pt continues to be on diltiazem drip at 10ml/hr. Dilt was paused d/t to low Bp when care was transferred to this writer. Restarted at 10ml/hr. Patient able to meet BP and HR parameters with this rate. Denies chest pain. Cardiac rhythm is Afib. HR in the 80s to 90 range. Heparin drip also infusing. Anti-xa at 0400 not at goal range, pt received a bolus and rate increased. Next anti-xa due at 1055 am. Cardiology consult in place.

## 2022-12-24 NOTE — CONSULTS
CARDIOLOGY CONSULT NOTE         Assessment:   1.  Paroxysmal atrial fibrillation (H)-possibly persistent, not valvular based on echo, symptomatic.  He is CHADS 2 VASC is for the most part 2, soon-to-be 3.  Given this would restart Eliquis 5 mg twice a day although he stopped this in the past secondary to bleeding when he was at work.  Given that he has had this for 10 to 15 years with at least 2 hospitalizations strongly recommend ablation.  We will give him some flecainide 50 mg tablets as a pill in the pocket approach, would set him up to see the EP nurse practitioners to discuss ablation.  Potassium, magnesium, and TSH were all normal.  2.  HTN (hypertension)-profoundly elevated, will change metoprolol tartrate over to metoprolol succinate 100 mg tablets.  3.  Obesity-weight loss.  Strongly suspect he has sleep apnea and recommend sleep study.  4.  Hyperglycemia-glucose is 131, would check hemoglobin A1c and might need stricter diet.  5.  Mild cardiomyopathy-ejection fraction 40 to 45%.  Possibly related to atrial fibrillation.  Will perform outpatient stress nuclear, recheck ejection fraction once stable.     Plan:   1.  Change IV heparin over to Eliquis.  2.  Change metoprolol over to Toprol 100 mg tablets.  3.  Give him flecainide 50 mg pill in the pocket approach on discharge.  4.  Work on weight loss and arrange for outpatient sleep study.  5.  Needs outpatient exercise stress nuclear.  6.  Would recommend he be seen by EP for possible ablation.  7.  Can follow with me as primary cardiologist.     Current History:   [facility] has been requested by Dr. Jamin Mooney to evaluate Hugo Quinn  who is a 62 year old year old white male for atrial fibrillation.  Patient tells me for the last 10 to 15 years he has had palpitations which come and go at certain times.  He has had sustained palpitations requiring hospitalization in [facility] for 3 days due to atrial fibrillation.  Last  evening, while shopping at [employer], he developed a fast heartbeat and became somewhat flushed.  There is no shortness of breath or lightheadedness.  He sat down to try some deep breathing and did not go away at which point time he left the store and sat in his car for half an hour trying the same. He took an atenolol and still felt somewhat flushed and had palpitations and presented to the emergency department.  He is noted to be in atrial fibrillation, placed on a heparin drip as well as a Cardizem drip, he spontaneous converted to sinus rhythm and cardiology consultation was requested.    Past Medical History:     Past Medical History:   Diagnosis Date     Basal cell carcinoma  Benign essential hypertension  Paroxysmal atrial fibrillation       Past history is negative for cancer, tuberculosis, diabetes mellitus, myocardial infarction,  rheumatic fever, cerebrovascular accident, chronic kidney disease, peptic ulcer disease, chronic obstructive pulmonary disease, or thyroid disorder  and no lipid disorder.    Past Surgical History:     Past Surgical History:   Procedure Laterality Date     MOHS MICROGRAPHIC PROCEDURE      He  has a past surgical history that includes Mohs micrographic procedure.    Family History:   Reviewed, and negative for coronary artery disease.    Social History:   Reviewed, and he  reports that he has never smoked. He does not have any smokeless tobacco history on file.  He drinks an occasional alcohol, is single, lives alone independently, works at [employer], and his primary physician is in [facility] [place].    Meds:       gabapentin  300 mg Oral BID     metoprolol tartrate  25 mg Oral Q6H     pantoprazole  40 mg Oral QAM AC     sodium chloride (PF)  3 mL Intracatheter Q8H     Allergies:   Patient has no known allergies.    Review of Systems:   A 12 point comprehensive review of systems was  as follows:   General: negative for fatigue, weight loss or  "weight gain  Constitutional: negative for anorexia, chills, fevers, malaise, night sweats   Eyes: negative for cataracts, color blindness, contacts/glasses, glaucoma, icterus, irritation, redness and visual disturbance  Ears, nose, mouth, throat, and face: negative for ear drainage, earaches, epistaxis, facial trauma, hearing loss, hoarseness, nasal congestion, snoring, sore mouth, sore throat, tinnitus and voice change  Respiratory: negative for cough, dyspnea on exertion,  hemoptysis, sputum production, pleurisy, stridor, wheezing, PND, orthopnea  Cardiovascular: Positive palpitations, negative for chest pain, chest pressure/discomfort, claudication, dyspnea, exertional chest pressure/discomfort, fatigue, irregular heart beat, lower extremity edema, near-syncope,  syncope   Gastrointestinal: negative for abdominal pain, change in bowel haibits, constipation, diarrhea, dyspepsia, dysphagia, jaundice, melena, nausea, odynophagia, reflux symptoms and vomiting  Genitourinary: negative for decreased stream  Hematologic/lymphatic: negative for bleeding  Musculoskeletal: negative for arthralgias, back pain, bone pain, muscle weakness, myalgias, neck pain and stiff joints  Neurological: negative for syncope, presyncope, coordination problems, dizziness, gait problems, headaches, memory problems, paresthesia, seizures, speech problems, tremors, vertigo and weakness    Objective:     Physical Exam:  BP (!) 167/78 (BP Location: Left arm)   Pulse 56   Temp 97.8  F (36.6  C) (Oral)   Resp 20   Ht 1.88 m (6' 2\")   Wt 144.5 kg (318 lb 9.6 oz)   SpO2 98%   BMI 40.91 kg/m       Head:    Normocephalic, without obvious abnormality, atraumatic   Eyes:    PERRL, conjunctiva/corneas clear, EOM's intact,           Nose:   Nares normal, septum midline, mucosa normal, no drainage  or sinus tenderness   Throat:   Lips, mucosa, and tongue normal; teeth and gums normal   Neck:   Supple, symmetrical, trachea midline, no adenopathy;    " "    thyroid:  No enlargement/tenderness/nodules; no carotid    bruit or JVD   Back:     Symmetric, no curvature, ROM normal, no CVA tenderness   Lungs:     Clear to auscultation bilaterally, respirations unlabored   Chest wall:    No tenderness or deformity   Heart:    Regular rate and rhythm, S1 and S2 normal, no murmur, rub   or gallop   Abdomen:     Soft, non-tender, bowel sounds active all four quadrants,     no masses, no organomegaly   Extremities:   Extremities normal, atraumatic, no cyanosis or edema   Pulses:   2+ and symmetric all extremities   Skin:   Skin color, texture, turgor normal, no rashes or lesions   Neurologic:   CNII-XII intact. Normal strength, sensation and reflexes       throughout     Cardiographics and Imaging  Radiology Results: Chest x-ray shows not performed    EKG Results: personally reviewed sinus rhythm, leftward axis, poor R wave progression.    Lab Review   Pertinent Labs  Lab Results: personally reviewed       No results found for: CKTOTAL, CKMB, TROPONINI    Lab Results   Component Value Date    BUN 21.3 12/23/2022     12/23/2022    CO2 26 12/23/2022       Lab Results   Component Value Date    WBC 8.8 12/23/2022    HGB 16.7 12/23/2022    HCT 50.2 12/23/2022    MCV 89 12/23/2022     12/23/2022       No results found for: BNP     Clinically Significant Risk Factors Present on Admission                  # Severe Obesity: Estimated body mass index is 40.91 kg/m  as calculated from the following:    Height as of this encounter: 1.88 m (6' 2\").    Weight as of this encounter: 144.5 kg (318 lb 9.6 oz).    Cardiovascular: Cardiac Arrhythmia: Atrial fibrillation: Paroxysmal           "

## 2022-12-25 NOTE — PLAN OF CARE
Problem: Plan of Care - These are the overarching goals to be used throughout the patient stay.    Goal: Plan of Care Review  Description: The Plan of Care Review/Shift note should be completed every shift.  The Outcome Evaluation is a brief statement about your assessment that the patient is improving, declining, or no change.  This information will be displayed automatically on your shift note.  12/24/2022 1859 by Emma More RN  Outcome: Met  12/24/2022 1645 by Emma More RN  Outcome: Progressing   Goal Outcome Evaluation:       Patient discharged home. Discharge instructions reviewed with patient.

## 2022-12-25 NOTE — DISCHARGE SUMMARY
[facility]  Hospitalist Discharge Summary      Date of Admission:  12/23/2022  Date of Discharge:  12/24/2022  6:36 PM  Discharging Provider: Estrellita Ruelas MD  Discharge Service: Hospitalist Service    Discharge Diagnoses   Active Problems:    HTN (hypertension)    Paroxysmal atrial fibrillation (H)    Atrial fibrillation with RVR (H)    Gastroesophageal reflux disease        Follow-ups Needed After Discharge   Follow-up Appointments     Follow-up and recommended labs and tests       Follow up with primary care provider, [facility], within 7   days to evaluate medication change and for hospital follow- up.  The   following labs/tests are recommended: cbc, bmp, mg.    Follow up with EP cardiology for a fib ablation evaluation.  Sleep study for MAUREEN evaluation.  Follow up with bariatric clinic for weight loss.             Unresulted Labs Ordered in the Past 30 Days of this Admission     No orders found from 11/23/2022 to 12/24/2022.          Discharge Disposition   Discharged to home  Condition at discharge: Stable      Hospital Course   obesity, paroxysmal A. Fib presenting with A. fib RVR and elevated troponin      Afib with RVR H/O PAF.   -  Ventricular rates improved on IV diltiazem drip now decreased and stopped dilt  - stopped atenolol   - cardiology seen and ok for discharge   - Change IV heparin over to Eliquis.  - Change metoprolol over to Toprol 100 mg tablets.  - Give him flecainide 50 mg pill in the pocket approach on discharge.  -  Work on weight loss and arrange for outpatient sleep study.  - Would recommend he be seen by EP for possible ablation.       Minimally Elevated troponin. Possible demand but not really that High at 32  -  Patient denies having any chest pain.   - Echo done   - Cards consult  Needs outpatient exercise stress nuclear.  - follow up with jordana     GERD on PPI     Postherpetic neuralgia.  gabapentin    Consultations This Hospital Stay    PHARMACY IP CONSULT  PHARMACY IP CONSULT  CARDIOLOGY IP CONSULT  PHARMACY TEST CLAIM IP CONSULT    Code Status   Prior    Time Spent on this Encounter          Estrellita Ruelas MD  [facility]ART [facility]  [address] [place], [place] [id number]  Phone: [phone]  Fax: [phone]  ______________________________________________________________________    Physical Exam   Vital Signs: Temp: 97.9  F (36.6  C) Temp src: Oral BP: 130/73 Pulse: 58   Resp: 18 SpO2: 97 % O2 Device: None (Room air)    Weight: 318 lbs 9.6 oz  Physical Exam  Constitutional:       General: He is not in acute distress.     Appearance: He is obese.   Cardiovascular:      Rate and Rhythm: Normal rate. Rhythm irregular.      Pulses: Normal pulses.      Heart sounds: Normal heart sounds.   Pulmonary:      Effort: Pulmonary effort is normal.      Breath sounds: Normal breath sounds.   Abdominal:      General: Bowel sounds are normal.      Palpations: Abdomen is soft.   Musculoskeletal:         General: Normal range of motion.   Skin:     General: Skin is warm and dry.      Capillary Refill: Capillary refill takes less than 2 seconds.   Neurological:      General: No focal deficit present.      Mental Status: He is alert and oriented to person, place, and time. Mental status is at baseline.              Primary Care Physician   [facility]    Discharge Orders      Reason for your hospital stay    Rapid a fib     Follow-up and recommended labs and tests     Follow up with primary care provider, [facility], within 7 days to evaluate medication change and for hospital follow- up.  The following labs/tests are recommended: cbc, bmp, mg.    Follow up with EP cardiology for a fib ablation evaluation.  Sleep study for MAUREEN evaluation.  Follow up with bariatric clinic for weight loss.     Activity    Your activity upon discharge: activity as tolerated     Diet    Follow this diet upon discharge:  Orders Placed This Encounter      Combination Diet Low Saturated Fat Na <2400mg Diet, No Caffeine Diet     NM Lexiscan stress test       Significant Results and Procedures   Most Recent 3 CBC's:  Recent Labs   Lab Test 22  1902   WBC 8.8   HGB 16.7   MCV 89        Most Recent 3 BMP's:  Recent Labs   Lab Test 22  1154 22  2100   NA  --  144   POTASSIUM 4.3 4.2   CHLORIDE  --  110*   CO2  --  26   BUN  --  21.3   CR  --  0.86   ANIONGAP  --  8   BA  --  8.8   GLC  --  131*     Most Recent 2 LFT's:  Recent Labs   Lab Test 22  2100   AST 59*   ALT 76*   ALKPHOS 68   BILITOTAL 0.3     Most Recent 3 INR's:  Recent Labs   Lab Test 22  1154   INR 0.99   ,   Results for orders placed or performed during the hospital encounter of 22   Echocardiogram Complete     Value    LVEF  40-45% (mildly reduced)    Narrative    [id number]  [id number]  [id number]  [id number]^[place]ADIE^[place] [address]     Name: LOU SOTO  MRN: [id number]  : 1960  Study Date: 2022 12:01 PM  Age: 62 yrs  Gender: Male  Patient Location: [facility]  Reason For Study: Atrial Fibrillation  Ordering Physician: CATERINA RAMACHANDRAN  Performed By: ANTON     BSA: 2.7 m2  Height: 74 in  Weight: 320 lb  HR: 54  BP: 150/71 mmHg  ______________________________________________________________________________  Procedure  The study was technically difficult. No hemodynamically significant valvular  abnormalities on 2D or color flow imaging.  ______________________________________________________________________________  Interpretation Summary     The left ventricle is normal in size.  Left ventricular function is decreased. The ejection fraction is 40-45%  (mildly reduced).  There is mild-moderate global hypokinesia of the left ventricle.  The ascending aorta is Mildly dilated.  The study was technically difficult.  No hemodynamically significant valvular abnormalities  on 2D or color flow  imaging.  ______________________________________________________________________________  Left Ventricle  The left ventricle is normal in size. Left ventricular function is decreased.  The ejection fraction is 40-45% (mildly reduced). There is normal left  ventricular wall thickness. Left ventricular diastolic function is normal.  There is mild-moderate global hypokinesia of the left ventricle.     Right Ventricle  The right ventricle is not well visualized.     Atria  Normal left atrial size. Right atrium not well visualized. There is no color  Doppler evidence of an atrial shunt.     Mitral Valve  There is mild mitral annular calcification. There is trace mitral  regurgitation.     Tricuspid Valve  The tricuspid valve is not well visualized. No tricuspid regurgitation.     Aortic Valve  The aortic valve is not well visualized. No aortic regurgitation is present.  No aortic stenosis is present.     Pulmonic Valve  The pulmonic valve is not well visualized. This degree of valvular  regurgitation is within normal limits.     Vessels  The ascending aorta is Mildly dilated. IVC diameter <2.1 cm collapsing >50%  with sniff suggests a normal RA pressure of 3 mmHg.     Pericardium  There is no pericardial effusion.     ______________________________________________________________________________  MMode/2D Measurements & Calculations  Ao root diam: 4.3 cm  asc Aorta Diam: 4.0 cm  LVOT diam: 2.1 cm  LVOT area: 3.6 cm2     LA Volume Indexed (AL/bp): 16.1 ml/m2     Doppler Measurements & Calculations  MV E max micik: 59.1 cm/sec  MV A max micki: 75.4 cm/sec  MV E/A: 0.78  MV dec slope: 233.4 cm/sec2  MV dec time: 0.25 sec  LV V1 max P.3 mmHg  LV V1 max: 75.4 cm/sec  LV V1 VTI: 18.3 cm  SV(LVOT): 65.1 ml  SI(LVOT): 24.5 ml/m2  PA acc time: 0.25 sec  E/E' av.5  Lateral E/e': 8.4     Medial E/e': 8.6     ______________________________________________________________________________  Report approved by:  Trevon Ruiz 12/24/2022 02:03 PM               Discharge Medications   Discharge Medication List as of 12/24/2022  5:49 PM      START taking these medications    Details   apixaban ANTICOAGULANT (ELIQUIS) 5 MG tablet Take 1 tablet (5 mg) by mouth 2 times daily for 30 days, Disp-60 tablet, R-0, E-Prescribe      flecainide (TAMBOCOR) 50 MG tablet Take 1 tablet (50 mg) by mouth daily as needed (Take as needed for atrial fibrillation), Disp-20 tablet, R-0, E-Prescribe      metoprolol succinate ER (TOPROL XL) 100 MG 24 hr tablet Take 1 tablet (100 mg) by mouth daily for 30 days, Disp-30 tablet, R-0, E-Prescribe         CONTINUE these medications which have NOT CHANGED    Details   acetaminophen (TYLENOL) 500 MG tablet Take 500-1,000 mg by mouth every 6 hours as needed for pain, Historical      gabapentin (NEURONTIN) 300 MG capsule Take 300 mg by mouth 2 times daily, Historical      omeprazole (PRILOSEC) 20 MG DR capsule Take 20 mg by mouth daily before breakfast, Historical         STOP taking these medications       atenolol (TENORMIN) 25 MG tablet Comments:   Reason for Stopping:             Allergies   No Known Allergies

## 2022-12-26 RX ORDER — FLECAINIDE ACETATE 50 MG/1
50 TABLET ORAL DAILY PRN
Qty: 20 TABLET | Refills: 0 | Status: SHIPPED | OUTPATIENT
Start: 2022-12-26

## 2022-12-26 RX ORDER — GABAPENTIN 300 MG/1
300 CAPSULE ORAL 2 TIMES DAILY
Qty: 60 CAPSULE | Refills: 0 | Status: SHIPPED | OUTPATIENT
Start: 2022-12-26 | End: 2023-01-25

## 2022-12-26 RX ORDER — METOPROLOL SUCCINATE 100 MG/1
100 TABLET, EXTENDED RELEASE ORAL DAILY
Qty: 30 TABLET | Refills: 0 | Status: SHIPPED | OUTPATIENT
Start: 2022-12-26 | End: 2023-01-25

## 2022-12-27 ENCOUNTER — TELEPHONE (OUTPATIENT)
Dept: CARDIOLOGY | Facility: CLINIC | Age: 62
End: 2022-12-27

## 2022-12-27 DIAGNOSIS — I48.91 ATRIAL FIBRILLATION WITH RVR (H): Primary | ICD-10-CM

## 2022-12-27 DIAGNOSIS — E66.9 OBESITY, UNSPECIFIED CLASSIFICATION, UNSPECIFIED OBESITY TYPE, UNSPECIFIED WHETHER SERIOUS COMORBIDITY PRESENT: ICD-10-CM

## 2022-12-27 NOTE — TELEPHONE ENCOUNTER
Chart reviewed. Order was placed by discharging MD for stress test. In order it states that patient will call back to arrange. He was prescribed the Metoprolol and Eliquis along with Flecainide pill in pocket.    Order placed for Afib NP referral + sleep medicine referral. Msg to schedulers to arrange. -[facility]

## 2022-12-27 NOTE — TELEPHONE ENCOUNTER
----- Message from Tapan Posada MD sent at 12/25/2022  1:30 PM CST -----  New consult from [facility], atrial fibrillation, ECG changes.  Needs exercise stress nuclear, needs appointment with EP nurse practitioners, need to evaluate for sleep apnea.  Please confirm that he did get flecainide pill in the pocket approach as well as taking Eliquis and metoprolol.  LF

## 2023-01-12 LAB
ATRIAL RATE - MUSE: 163 BPM
DIASTOLIC BLOOD PRESSURE - MUSE: NORMAL MMHG
INTERPRETATION ECG - MUSE: NORMAL
P AXIS - MUSE: NORMAL DEGREES
PR INTERVAL - MUSE: NORMAL MS
QRS DURATION - MUSE: 88 MS
QT - MUSE: 290 MS
QTC - MUSE: 473 MS
R AXIS - MUSE: 158 DEGREES
SYSTOLIC BLOOD PRESSURE - MUSE: NORMAL MMHG
T AXIS - MUSE: -61 DEGREES
VENTRICULAR RATE- MUSE: 160 BPM